# Patient Record
Sex: FEMALE | Race: WHITE | NOT HISPANIC OR LATINO | Employment: UNEMPLOYED | URBAN - METROPOLITAN AREA
[De-identification: names, ages, dates, MRNs, and addresses within clinical notes are randomized per-mention and may not be internally consistent; named-entity substitution may affect disease eponyms.]

---

## 2023-07-26 ENCOUNTER — TELEPHONE (OUTPATIENT)
Dept: GASTROENTEROLOGY | Facility: CLINIC | Age: 78
End: 2023-07-26

## 2023-07-26 ENCOUNTER — OFFICE VISIT (OUTPATIENT)
Dept: GASTROENTEROLOGY | Facility: CLINIC | Age: 78
End: 2023-07-26
Payer: MEDICARE

## 2023-07-26 VITALS
DIASTOLIC BLOOD PRESSURE: 68 MMHG | WEIGHT: 170 LBS | HEIGHT: 63 IN | BODY MASS INDEX: 30.12 KG/M2 | SYSTOLIC BLOOD PRESSURE: 149 MMHG

## 2023-07-26 DIAGNOSIS — K52.9 CHRONIC DIARRHEA: Primary | ICD-10-CM

## 2023-07-26 PROCEDURE — 99204 OFFICE O/P NEW MOD 45 MIN: CPT | Performed by: INTERNAL MEDICINE

## 2023-07-26 RX ORDER — SODIUM BICARBONATE 650 MG/1
TABLET ORAL
COMMUNITY
Start: 2023-06-26

## 2023-07-26 RX ORDER — ASPIRIN 81 MG/1
81 TABLET ORAL
COMMUNITY

## 2023-07-26 RX ORDER — POLYETHYLENE GLYCOL 3350, SODIUM SULFATE ANHYDROUS, SODIUM BICARBONATE, SODIUM CHLORIDE, POTASSIUM CHLORIDE 236; 22.74; 6.74; 5.86; 2.97 G/4L; G/4L; G/4L; G/4L; G/4L
4000 POWDER, FOR SOLUTION ORAL ONCE
Qty: 4000 ML | Refills: 0 | Status: SHIPPED | OUTPATIENT
Start: 2023-07-26 | End: 2023-07-26

## 2023-07-26 RX ORDER — LORATADINE 10 MG/1
10 TABLET ORAL
COMMUNITY

## 2023-07-26 RX ORDER — CHOLECALCIFEROL (VITAMIN D3) 125 MCG
2000 CAPSULE ORAL DAILY
COMMUNITY
Start: 2023-06-20

## 2023-07-26 RX ORDER — LENALIDOMIDE 10 MG/1
10 CAPSULE ORAL
COMMUNITY

## 2023-07-26 RX ORDER — DILTIAZEM HYDROCHLORIDE 180 MG/1
CAPSULE, COATED, EXTENDED RELEASE ORAL
COMMUNITY
Start: 2023-07-23

## 2023-07-26 NOTE — PROGRESS NOTES
[facility] - Outpatient Consultation  [facility] [place] 68 y.o. female MRN: [id number]  Encounter: [id number]    ASSESSMENT AND PLAN:      1. Chronic diarrhea  Patient thinks she has had diarrhea since starting Revlimid, however has increased in frequency in the last few weeks. I will check stool cultures and C. difficile. She is due for screening colonoscopy anyway, so we will proceed with 1 now. If stool studies are normal I will take biopsies throughout tomorrow out microscopic colitis. Procedure risks and preparation discussed in detail  - Stool culture; Future  - Clostridium difficile toxin by PCR; Future  - Colonoscopy; Future  - polyethylene glycol (Golytely) 4000 mL solution; Take 4,000 mL by mouth once for 1 dose Take 4000 mL by mouth once for 1 dose. Use as directed  Dispense: 4000 mL; Refill: 0      Follow up Appointment: For colonoscopy    Chief Complaint   Patient presents with   • Diarrhea     Schedule colonoscopy         HPI:   [facility] is a 68y.o. year old female with a PMH significant for multiple myeloma who presents from a consultation from PCP for chronic diarrhea. For several years now. She thinks it correlates with starting Revlimid. It is associated with lower abdominal cramping. She does not see blood or mucus in the stool. She does not wake up at night 4. Over the last 2 to 3 weeks however she has had an increase in frequency even more than her baseline. She denies any sick contact or travel. No one else has been sick. She had a colonoscopy 11 years ago that showed no growths or polyps and is due for screening at this time.     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   • Hypertension    • Multiple myeloma ([address])      Past Surgical History:   Procedure Laterality Date   • APPENDECTOMY     • CHOLECYSTECTOMY     • COLONOSCOPY     • HYSTERECTOMY       Social History     Substance and Sexual Activity   Alcohol Use Never     Social History     Substance and Sexual Activity   Drug Use Not on file     Social History     Tobacco Use   Smoking Status Never   Smokeless Tobacco Never     Family History   Problem Relation Age of Onset   • Colon cancer Neg Hx    • Colon polyps Neg Hx        Meds/Allergies     Current Outpatient Medications:   •  Acetylcysteine (NAC PO)  •  aspirin (ECOTRIN LOW STRENGTH) 81 mg EC tablet  •  Cholecalciferol (Vitamin D3) 50 MCG (2000 UT) TABS  •  diltiazem (CARDIZEM CD) 180 mg 24 hr capsule  •  lenalidomide (REVLIMID) 10 MG CAPS  •  loratadine (CLARITIN) 10 mg tablet  •  polyethylene glycol (Golytely) 4000 mL solution  •  sodium bicarbonate 650 mg tablet    Allergies   Allergen Reactions   • Allopurinol Nausea Only   • Keflex [Cephalexin] Nausea Only   • Reglan [Metoclopramide] Other (See Comments)     Uncontrolled muscle movement   • Zofran [Ondansetron] Other (See Comments)     Uncontrolled muscle movement       PHYSICAL EXAM:    Blood pressure 149/68, height 5' 2.5" (1.588 m), weight 77.1 kg (170 lb). Body mass index is 30.6 kg/m². General Appearance: NAD, cooperative, alert  Eyes: Anicteric  GI:  Soft, non-tender, non-distended; normal bowel sounds; no masses, no organomegaly   Rectal: Deferred  Musculoskeletal: No edema. Skin:  No jaundice    Lab Results:   No results found for: "WBC", "HGB", "MCV", "PLT", "INR"  No results found for: "NA", "K", "CL", "CO2", "ANIONGAP", "BUN", "CREATININE", "GLUCOSE", "GLUF", "CALCIUM", "CORRECTEDCA", "AST", "ALT", "ALKPHOS", "PROT", "BILITOT", "EGFR"  No results found for: "IRON", "TIBC", "FERRITIN"  No results found for: "LIPASE"    Radiology Results:   No results found.

## 2023-07-26 NOTE — H&P (VIEW-ONLY)
[facility] - Outpatient Consultation  [facility] [place] 68 y.o. female MRN: [id number]  Encounter: [id number]    ASSESSMENT AND PLAN:      1. Chronic diarrhea  Patient thinks she has had diarrhea since starting Revlimid, however has increased in frequency in the last few weeks. I will check stool cultures and C. difficile. She is due for screening colonoscopy anyway, so we will proceed with 1 now. If stool studies are normal I will take biopsies throughout tomorrow out microscopic colitis. Procedure risks and preparation discussed in detail  - Stool culture; Future  - Clostridium difficile toxin by PCR; Future  - Colonoscopy; Future  - polyethylene glycol (Golytely) 4000 mL solution; Take 4,000 mL by mouth once for 1 dose Take 4000 mL by mouth once for 1 dose. Use as directed  Dispense: 4000 mL; Refill: 0      Follow up Appointment: For colonoscopy    Chief Complaint   Patient presents with   • Diarrhea     Schedule colonoscopy         HPI:   [facility] [place] is a 68y.o. year old female with a PMH significant for multiple myeloma who presents from a consultation from PCP for chronic diarrhea. For several years now. She thinks it correlates with starting Revlimid. It is associated with lower abdominal cramping. She does not see blood or mucus in the stool. She does not wake up at night 4. Over the last 2 to 3 weeks however she has had an increase in frequency even more than her baseline. She denies any sick contact or travel. No one else has been sick. She had a colonoscopy 11 years ago that showed no growths or polyps and is due for screening at this time.     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   • Hypertension    • Multiple myeloma ([address])      Past Surgical History:   Procedure Laterality Date   • APPENDECTOMY     • CHOLECYSTECTOMY     • COLONOSCOPY     • HYSTERECTOMY       Social History     Substance and Sexual Activity   Alcohol Use Never     Social History     Substance and Sexual Activity   Drug Use Not on file     Social History     Tobacco Use   Smoking Status Never   Smokeless Tobacco Never     Family History   Problem Relation Age of Onset   • Colon cancer Neg Hx    • Colon polyps Neg Hx        Meds/Allergies     Current Outpatient Medications:   •  Acetylcysteine (NAC PO)  •  aspirin (ECOTRIN LOW STRENGTH) 81 mg EC tablet  •  Cholecalciferol (Vitamin D3) 50 MCG (2000 UT) TABS  •  diltiazem (CARDIZEM CD) 180 mg 24 hr capsule  •  lenalidomide (REVLIMID) 10 MG CAPS  •  loratadine (CLARITIN) 10 mg tablet  •  polyethylene glycol (Golytely) 4000 mL solution  •  sodium bicarbonate 650 mg tablet    Allergies   Allergen Reactions   • Allopurinol Nausea Only   • Keflex [Cephalexin] Nausea Only   • Reglan [Metoclopramide] Other (See Comments)     Uncontrolled muscle movement   • Zofran [Ondansetron] Other (See Comments)     Uncontrolled muscle movement       PHYSICAL EXAM:    Blood pressure 149/68, height 5' 2.5" (1.588 m), weight 77.1 kg (170 lb). Body mass index is 30.6 kg/m². General Appearance: NAD, cooperative, alert  Eyes: Anicteric  GI:  Soft, non-tender, non-distended; normal bowel sounds; no masses, no organomegaly   Rectal: Deferred  Musculoskeletal: No edema. Skin:  No jaundice    Lab Results:   No results found for: "WBC", "HGB", "MCV", "PLT", "INR"  No results found for: "NA", "K", "CL", "CO2", "ANIONGAP", "BUN", "CREATININE", "GLUCOSE", "GLUF", "CALCIUM", "CORRECTEDCA", "AST", "ALT", "ALKPHOS", "PROT", "BILITOT", "EGFR"  No results found for: "IRON", "TIBC", "FERRITIN"  No results found for: "LIPASE"    Radiology Results:   No results found.

## 2023-08-01 LAB
C DIFF TOX A+B STL QL IA: NEGATIVE
CAMPYLOBACTER STL CULT: NORMAL
Lab: NORMAL
Lab: NORMAL
SALM + SHIG STL CULT: NORMAL
SL AMB E COLI SHIGA TOXIN EIA: NEGATIVE

## 2023-08-11 ENCOUNTER — TELEPHONE (OUTPATIENT)
Dept: GASTROENTEROLOGY | Facility: CLINIC | Age: 78
End: 2023-08-11

## 2023-08-23 ENCOUNTER — TELEPHONE (OUTPATIENT)
Age: 78
End: 2023-08-23

## 2023-08-23 NOTE — TELEPHONE ENCOUNTER
Pt call transferred to nurses line     Pt who is scheduled tomorrow for colon, has chronic cough for over a month. Was on steroids however cough remained. She wanted to ensure she can proceed with colon. Denies fever, sob, worsening cough, or positive COVID. I advised pt can proceed.

## 2023-08-24 ENCOUNTER — HOSPITAL ENCOUNTER (OUTPATIENT)
Dept: GASTROENTEROLOGY | Facility: AMBULATORY SURGERY CENTER | Age: 78
Discharge: HOME | End: 2023-08-24
Attending: INTERNAL MEDICINE
Payer: MEDICARE

## 2023-08-24 ENCOUNTER — ANESTHESIA (OUTPATIENT)
Dept: GASTROENTEROLOGY | Facility: AMBULATORY SURGERY CENTER | Age: 78
End: 2023-08-24

## 2023-08-24 ENCOUNTER — ANESTHESIA EVENT (OUTPATIENT)
Dept: GASTROENTEROLOGY | Facility: AMBULATORY SURGERY CENTER | Age: 78
End: 2023-08-24

## 2023-08-24 VITALS
TEMPERATURE: 98.4 F | BODY MASS INDEX: 30.12 KG/M2 | SYSTOLIC BLOOD PRESSURE: 130 MMHG | WEIGHT: 170 LBS | OXYGEN SATURATION: 98 % | HEIGHT: 63 IN | DIASTOLIC BLOOD PRESSURE: 65 MMHG | HEART RATE: 60 BPM | RESPIRATION RATE: 20 BRPM

## 2023-08-24 DIAGNOSIS — K52.9 CHRONIC DIARRHEA: ICD-10-CM

## 2023-08-24 PROBLEM — I10 HTN (HYPERTENSION): Status: ACTIVE | Noted: 2023-08-24

## 2023-08-24 PROCEDURE — 88305 TISSUE EXAM BY PATHOLOGIST: CPT | Performed by: SPECIALIST

## 2023-08-24 PROCEDURE — 88313 SPECIAL STAINS GROUP 2: CPT | Performed by: SPECIALIST

## 2023-08-24 PROCEDURE — 45380 COLONOSCOPY AND BIOPSY: CPT | Performed by: INTERNAL MEDICINE

## 2023-08-24 RX ORDER — SODIUM CHLORIDE, SODIUM LACTATE, POTASSIUM CHLORIDE, CALCIUM CHLORIDE 600; 310; 30; 20 MG/100ML; MG/100ML; MG/100ML; MG/100ML
50 INJECTION, SOLUTION INTRAVENOUS CONTINUOUS
Status: DISCONTINUED | OUTPATIENT
Start: 2023-08-24 | End: 2023-08-28 | Stop reason: HOSPADM

## 2023-08-24 RX ORDER — LIDOCAINE HYDROCHLORIDE 20 MG/ML
INJECTION, SOLUTION EPIDURAL; INFILTRATION; INTRACAUDAL; PERINEURAL AS NEEDED
Status: DISCONTINUED | OUTPATIENT
Start: 2023-08-24 | End: 2023-08-24

## 2023-08-24 RX ORDER — ACYCLOVIR 400 MG/1
400 TABLET ORAL 2 TIMES DAILY
COMMUNITY

## 2023-08-24 RX ORDER — PROPOFOL 10 MG/ML
INJECTION, EMULSION INTRAVENOUS AS NEEDED
Status: DISCONTINUED | OUTPATIENT
Start: 2023-08-24 | End: 2023-08-24

## 2023-08-24 RX ADMIN — PROPOFOL 50 MG: 10 INJECTION, EMULSION INTRAVENOUS at 13:56

## 2023-08-24 RX ADMIN — PROPOFOL 50 MG: 10 INJECTION, EMULSION INTRAVENOUS at 13:39

## 2023-08-24 RX ADMIN — SODIUM CHLORIDE, SODIUM LACTATE, POTASSIUM CHLORIDE, CALCIUM CHLORIDE 50 ML/HR: 600; 310; 30; 20 INJECTION, SOLUTION INTRAVENOUS at 13:12

## 2023-08-24 RX ADMIN — PROPOFOL 70 MG: 10 INJECTION, EMULSION INTRAVENOUS at 13:28

## 2023-08-24 RX ADMIN — LIDOCAINE HYDROCHLORIDE 50 MG: 20 INJECTION, SOLUTION EPIDURAL; INFILTRATION; INTRACAUDAL; PERINEURAL at 13:28

## 2023-08-24 RX ADMIN — PROPOFOL 50 MG: 10 INJECTION, EMULSION INTRAVENOUS at 13:50

## 2023-08-24 RX ADMIN — PROPOFOL 50 MG: 10 INJECTION, EMULSION INTRAVENOUS at 13:35

## 2023-08-24 RX ADMIN — PROPOFOL 50 MG: 10 INJECTION, EMULSION INTRAVENOUS at 13:44

## 2023-08-24 RX ADMIN — PROPOFOL 30 MG: 10 INJECTION, EMULSION INTRAVENOUS at 14:02

## 2023-08-24 NOTE — INTERVAL H&P NOTE
H&P reviewed. After examining the patient I find no changes in the patients condition since the H&P had been written.     Vitals:    08/24/23 1302   BP: (!) 177/84   Pulse: 80   Resp: 22   Temp: 98.4 °F (36.9 °C)   SpO2: 99%

## 2023-08-24 NOTE — ANESTHESIA PREPROCEDURE EVALUATION
Procedure:  COLONOSCOPY    Relevant Problems   CARDIO   (+) HTN (hypertension)      Chronic cough  H/o multiple myeloma  Follicular lymphoma  Physical Exam    Airway    Mallampati score: II  TM Distance: >3 FB  Neck ROM: full     Dental   Comment: None loose, No notable dental hx     Cardiovascular      Pulmonary      Other Findings        Anesthesia Plan  ASA Score- 3     Anesthesia Type- IV sedation with anesthesia with ASA Monitors. Additional Monitors:   Airway Plan:     Comment: Last of PO bowel prep: 08:00    Patient educated on the possibility for awareness under sedation and of the possibility of airway intervention in the event of an airway or procedural emergency  . Plan Factors-Exercise tolerance (METS): >4 METS. Chart reviewed. Patient summary reviewed. Patient is not a current smoker. Induction- intravenous. Postoperative Plan-     Informed Consent- Anesthetic plan and risks discussed with patient. I personally reviewed this patient with the CRNA. Discussed and agreed on the Anesthesia Plan with the CRNA. Dianne Liu

## 2023-08-24 NOTE — ANESTHESIA POSTPROCEDURE EVALUATION
Post-Op Assessment Note    CV Status:  Stable  Pain Score: 0    Pain management: adequate     Mental Status:  Awake   Hydration Status:  Stable   PONV Controlled:  None   Airway Patency:  Patent      Post Op Vitals Reviewed: Yes      Staff: Anesthesiologist, CRNA         No notable events documented.     BP  101/53   Temp      Pulse  59   Resp   20   SpO2   98

## 2023-08-30 PROCEDURE — 88305 TISSUE EXAM BY PATHOLOGIST: CPT | Performed by: SPECIALIST

## 2023-08-30 PROCEDURE — 88313 SPECIAL STAINS GROUP 2: CPT | Performed by: SPECIALIST

## 2024-10-15 NOTE — TELEPHONE ENCOUNTER
Left message for patient to call back and move procedure to an earlier time.
Scheduled date of colonoscopy (as of today): 08/24/23  Physician performing colonoscopy: Dr Jude Rodríguez  Location of colonoscopy: [facility]  Bowel prep reviewed with patient: Golytely  Instructions reviewed with patient by: Star Queen  Clearances: No
PACU